# Patient Record
Sex: FEMALE | Race: WHITE | URBAN - NONMETROPOLITAN AREA
[De-identification: names, ages, dates, MRNs, and addresses within clinical notes are randomized per-mention and may not be internally consistent; named-entity substitution may affect disease eponyms.]

---

## 2021-01-13 ENCOUNTER — TELEPHONE (OUTPATIENT)
Dept: NEUROLOGY | Age: 26
End: 2021-01-13

## 2021-01-13 NOTE — TELEPHONE ENCOUNTER
Kenya [address] 62 and s/w Shorty Carroll. I stated that we never received any referral docs, notes, or hx on this patient. I asked that they please get those faxed over this afternoon as patient is scheduled to see Dr Nilton Lawrence tomorrow and we have to have something in the chart prior to that appt. Shorty Carroll voiced understanding and states they will get the docs faxed right over.

## 2021-01-14 ENCOUNTER — OFFICE VISIT (OUTPATIENT)
Dept: NEUROLOGY | Age: 26
End: 2021-01-14
Payer: COMMERCIAL

## 2021-01-14 VITALS
HEIGHT: 68 IN | DIASTOLIC BLOOD PRESSURE: 73 MMHG | HEART RATE: 72 BPM | WEIGHT: 128 LBS | RESPIRATION RATE: 16 BRPM | BODY MASS INDEX: 19.4 KG/M2 | SYSTOLIC BLOOD PRESSURE: 107 MMHG

## 2021-01-14 DIAGNOSIS — Z86.59 HISTORY OF ANXIETY: ICD-10-CM

## 2021-01-14 DIAGNOSIS — G35 MULTIPLE SCLEROSIS (HCC): Primary | ICD-10-CM

## 2021-01-14 DIAGNOSIS — R53.83 FATIGUE, UNSPECIFIED TYPE: ICD-10-CM

## 2021-01-14 PROCEDURE — 99204 OFFICE O/P NEW MOD 45 MIN: CPT | Performed by: PSYCHIATRY & NEUROLOGY

## 2021-01-14 NOTE — PROGRESS NOTES
Chief Complaint   Patient presents with    New Patient     Wants MRI head, last MRI 5 years ago.  Multiple Sclerosis     Dx MS 2016       Brett Trinidad is a 26y.o. year old female who is seen for evaluation of multiple sclerosis. Says she was diagnosed around the age of 13 while living in [place], where she now resides. She had an abnormal MRI of the head at that time. Those films are reviewed. There is a considerable amount of white matter change in the periventricular region. Cervical and thoracic MRI is normal.  She says that her CSF was consistent with MS although I have none of those records. Sounds like she took Betaseron for a short period of time and had a very hard time tolerating it. Her family took her to a doctor in [place] who did a couple of treatments on her head of an unknown kind but it did not involve medication. This is all she has been treated with since that time. She is here today wanting a follow-up MRI. She appears to have an underlying anxiety problem but denies any focal logical difficulties or bladder symptoms currently. Is not interested in disease modifying treatment currently. Says her great aunt had MS. Active Ambulatory Problems     Diagnosis Date Noted    No Active Ambulatory Problems     Resolved Ambulatory Problems     Diagnosis Date Noted    No Resolved Ambulatory Problems     Past Medical History:   Diagnosis Date    Headache        History reviewed. No pertinent surgical history.     Family History   Problem Relation Age of Onset    Dementia Paternal Aunt     Neuropathy Maternal Grandmother     Alzheimer's Disease Maternal Grandfather     Neuropathy Paternal Grandmother        No Known Allergies    Social History     Socioeconomic History    Marital status: Single     Spouse name: Not on file    Number of children: Not on file    Years of education: Not on file    Highest education level: Not on file   Occupational History    Not on file   Social Denies all unless marked  Sleep: []? Insomnia []? Sleep Disturbance []? Snoring []? Restless Legs []? Daytime Sleepiness []? Sleep Apnea  [x]? Denies all unless marked                  No current outpatient medications on file. No current facility-administered medications for this visit. No outpatient medications have been marked as taking for the 1/14/21 encounter (Office Visit) with Nan Pruitt MD.       /73   Pulse 72   Resp 16   Ht 5' 8\" (1.727 m)   Wt 128 lb (58.1 kg)   BMI 19.46 kg/m²       Constitutional - well developed, well nourished. Anxious  Eyes - conjunctiva normal.  Pupils react to light  Ear, nose, throat -hearing intact to finger rub No scars, masses, or lesions over external nose or ears, no atrophy of tongue  Neck-symmetric, no masses noted, no jugular vein distension. No bruits noted. Respiration- chest wall appears symmetric, good expansion,   normal effort without use of accessory muscles  Cardiovascular- RRR  Musculoskeletal - no significant wasting of muscles noted, no bony deformities, gait no gross ataxia  Extremities-no clubbing, cyanosis or edema  Skin - warm, dry, and intact. No rash, erythema, or pallor. Psychiatric - mood, affect, and behavior appear normal.      Neurological exam  Awake, alert, fluent oriented x 3 appropriate affect  Attention and concentration appear appropriate  Recent and remote memory appears unremarkable  Speech normal without dysarthria  No clear issues with language of fund of knowledge    Cranial Nerve Exam   CN II- Visual fields grossly unremarkable. VA adequate.  Discs sharp  CN III, IV,VI- PERRLA, EOMI, No nystagmus, conjugate eye movements, no ptosis  CN V-sensation intact to LT over face  CN VII-no facial asymmetry  CN VIII-Hearing intact   CN IX and X- Palate elevates in midline  CN XI-good shoulder shrug  CN XII-Tongue midline with no fasciculations or fibrillations    Motor Exam  V/V throughout upper and lower extremities bilaterally, no cogwheeling, normal tone    Sensory Exam  Sensation intact to light touch , PP  upper and lower extremities bilaterally; normal vibration sense in LE's    Reflexes   2+ biceps bilaterally  2+ brachioradialis  2+ triceps  2+patella  2+ ankle jerks  No clonus ankles  No Owen's sign bilateral hands. No Babinski sign. Tremors- no tremors in hands or head noted    Gait  Normal base and speed  No ataxia. No Romberg sign    Coordination  Finger to nose and ANGELO-unremarkable          Assessment    ICD-10-CM    1. Multiple sclerosis ([address]  G35 MRI BRAIN W WO CONTRAST   2. Fatigue, unspecified type  R53.83    3. History of anxiety  Z86.59      The patient's history is compatible with MS as his her MRI from 2015. She has a nonfocal exam.  I do not know what kind of treatment she was receiving in [place]. Repeat MRI of the head has been ordered and she will follow-up in 1 month to discuss findings and recommendations. Plan  Orders Placed This Encounter   Procedures    MRI BRAIN W WO CONTRAST     Assess for hydrocephalus     Standing Status:   Future     Standing Expiration Date:   1/14/2022     Order Specific Question:   Reason for exam:     Answer:   ms f/u         Return in about 4 weeks (around 2/11/2021).     (Please note that portions of this note were completed with a voice recognition program. Efforts were made to edit the dictations but occasionally words are mis-transcribed.)

## 2021-01-18 ENCOUNTER — TELEPHONE (OUTPATIENT)
Dept: NEUROLOGY | Age: 26
End: 2021-01-18

## 2021-01-18 NOTE — TELEPHONE ENCOUNTER
Terrance's monm called requesting status of their MRI. Per  Mom was advised that someone would call to give appt date [facility]    Please return call to update her on status. The best time to call her to accommodate their needs is Anytime    Thank you.

## 2021-01-18 NOTE — TELEPHONE ENCOUNTER
Called and spoke with patient mom to let her that the MRI Order and Auth # has [facility]. To where [facility] should be calling patient to get him scheduled an appointment.

## 2021-01-21 ENCOUNTER — TELEPHONE (OUTPATIENT)
Dept: NEUROLOGY | Age: 26
End: 2021-01-21

## 2021-01-21 NOTE — TELEPHONE ENCOUNTER
Isac Coley requests that office return their call. The best time to reach her is Anytime. Terrance's mom called requesting status of their MRI. Per Mom she advised that she wants the order for her daughter's MRI to be sent to [facility] FAX: [phone].      Please return call to update her on status. The best time to call her to accommodate their needs is Anytime     Thank you.

## 2021-01-21 NOTE — TELEPHONE ENCOUNTER
Jazzy Palencia mother Vishnu Arriola) requests that office return their call. The best time to reach her is Anytime. At number on file: [phone]. Kurt Gilbert called to find out if order for MRI was sent over to [facility]. [facility] says that they have not yet received an order. Thank you.

## 2021-01-22 NOTE — TELEPHONE ENCOUNTER
Pt mom called stating Kin Piper still has not received fax for mri order. Please resend and contact mom to let her know that it has been sent. Confirmed fax # [phone] Is correct. Per mom, needs to have test done asap dur to ins, an PA expiration. Please call Marylyn Goldmann to advise.

## 2021-01-22 NOTE — TELEPHONE ENCOUNTER
I returned the call to Taya Yu let her know that the order has been faxed to [facility] several times in the last couple of days and that we are getting a confirmation on the fax going through. I stated I even spoke to [facility] that processes our precerts and referrals, she showed me her most recent fax attempt and we verified the fax number and the confirmation states it went through. I stated to Sharon Rico that I would go ahead and fax again to [facility] but that they continue to state they are not receiving our fax then they may need to come get the order from us or we can always schedule here if they would like. I apologized for the inconvenience but said that I can see where this is being done so I can't figure out where the problem is coming in. Sharon Rico voiced understanding. 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 Sharon Rico voiced understanding and asked that I fax the order to her attn at [phone]. Order printed again, faxed to the number Sharon Rico provided, confirmation has been received and I instructed Sharon Rico to please give me a call back if she does not receive this order today. Sharon Rico voiced understanding again.

## 2021-02-08 ENCOUNTER — TELEPHONE (OUTPATIENT)
Dept: NEUROLOGY | Age: 26
End: 2021-02-08

## 2021-02-08 NOTE — TELEPHONE ENCOUNTER
Torin Wilkinson requests that  return their call. The best time to reach her is Anytime. Pt called asking to be schedule at a later time before 02/19, due to [place] being in [place] cause of [place]. [facility] unable to sched in time frame w/Dr. Rhonda Pierson. Please contact pt to discuss. Thank you.

## 2021-02-09 NOTE — TELEPHONE ENCOUNTER
[facility] that [facility]  return their call. The best time to reach her is Anytime. Patient needs try get her appointment rescheduled before 2-19-21,please call the patient. Thank you.

## 2021-02-09 NOTE — TELEPHONE ENCOUNTER
Called and spoke with patient regarding rescheduling her appointment. I have patient double booked with Dr. Maricarmen Graves, patient is aware of the appointment time/date.

## 2021-02-16 ENCOUNTER — TELEPHONE (OUTPATIENT)
Dept: NEUROLOGY | Age: 26
End: 2021-02-16

## 2021-02-16 NOTE — TELEPHONE ENCOUNTER
Jorge Billsjohn Reaves's mom called to inform office that they decline option for Virtual Visit. Per mom pt has to be seen in office to bring disc for test results. Declined to cancel or reschedule next available on 3/5. Per mom patient's ins will end, so she needs to be seen before the end of feb. In office. Patient declined MyChart. Please call patient with any changes/questions/concerns.

## 2021-02-22 ENCOUNTER — TELEPHONE (OUTPATIENT)
Dept: NEUROLOGY | Age: 26
End: 2021-02-22

## 2021-02-22 NOTE — TELEPHONE ENCOUNTER
Called pt to reschedule no show appt with Dr. Nick Springer, left a voicemail for the pt to give us a call back if pt would like to reschedule.

## 2023-07-07 ENCOUNTER — TELEPHONE (OUTPATIENT)
Dept: NEUROLOGY | Age: 28
End: 2023-07-07

## 2023-07-07 NOTE — TELEPHONE ENCOUNTER
I left a voicemail for the patient to let them know we have received their referral, patient just needs a follow up appointment since she is already established. Asked for them to call us back to get the appointment scheduled, left call back number.

## 2023-07-07 NOTE — TELEPHONE ENCOUNTER
Pt's mother returned call, stating pt will be seeing a provider closer to her home, to close the referral.